# Patient Record
Sex: MALE | Race: OTHER | Employment: FULL TIME | URBAN - METROPOLITAN AREA
[De-identification: names, ages, dates, MRNs, and addresses within clinical notes are randomized per-mention and may not be internally consistent; named-entity substitution may affect disease eponyms.]

---

## 2021-02-10 ENCOUNTER — TELEPHONE (OUTPATIENT)
Dept: INTERNAL MEDICINE CLINIC | Facility: CLINIC | Age: 46
End: 2021-02-10

## 2021-02-10 ENCOUNTER — OFFICE VISIT (OUTPATIENT)
Dept: INTERNAL MEDICINE CLINIC | Facility: CLINIC | Age: 46
End: 2021-02-10
Payer: COMMERCIAL

## 2021-02-10 ENCOUNTER — LAB ENCOUNTER (OUTPATIENT)
Dept: LAB | Age: 46
End: 2021-02-10
Attending: INTERNAL MEDICINE
Payer: COMMERCIAL

## 2021-02-10 VITALS
HEART RATE: 64 BPM | HEIGHT: 69 IN | SYSTOLIC BLOOD PRESSURE: 137 MMHG | WEIGHT: 218 LBS | DIASTOLIC BLOOD PRESSURE: 85 MMHG | BODY MASS INDEX: 32.29 KG/M2

## 2021-02-10 DIAGNOSIS — R09.81 NASAL CONGESTION: ICD-10-CM

## 2021-02-10 DIAGNOSIS — M65.311 TRIGGER FINGER OF RIGHT THUMB: ICD-10-CM

## 2021-02-10 DIAGNOSIS — Z00.00 PE (PHYSICAL EXAM), ROUTINE: Primary | ICD-10-CM

## 2021-02-10 DIAGNOSIS — Z00.00 PE (PHYSICAL EXAM), ROUTINE: ICD-10-CM

## 2021-02-10 LAB
ALBUMIN SERPL-MCNC: 4.2 G/DL (ref 3.4–5)
ALBUMIN/GLOB SERPL: 1.3 {RATIO} (ref 1–2)
ALP LIVER SERPL-CCNC: 73 U/L
ALT SERPL-CCNC: 58 U/L
ANION GAP SERPL CALC-SCNC: 4 MMOL/L (ref 0–18)
AST SERPL-CCNC: 26 U/L (ref 15–37)
BASOPHILS # BLD AUTO: 0.07 X10(3) UL (ref 0–0.2)
BASOPHILS NFR BLD AUTO: 0.8 %
BILIRUB SERPL-MCNC: 0.8 MG/DL (ref 0.1–2)
BILIRUB UR QL: NEGATIVE
BUN BLD-MCNC: 15 MG/DL (ref 7–18)
BUN/CREAT SERPL: 13.6 (ref 10–20)
CALCIUM BLD-MCNC: 9.5 MG/DL (ref 8.5–10.1)
CHLORIDE SERPL-SCNC: 105 MMOL/L (ref 98–112)
CHOLEST SMN-MCNC: 228 MG/DL (ref ?–200)
CLARITY UR: CLEAR
CO2 SERPL-SCNC: 31 MMOL/L (ref 21–32)
COLOR UR: YELLOW
CREAT BLD-MCNC: 1.1 MG/DL
DEPRECATED RDW RBC AUTO: 37.9 FL (ref 35.1–46.3)
EOSINOPHIL # BLD AUTO: 0.07 X10(3) UL (ref 0–0.7)
EOSINOPHIL NFR BLD AUTO: 0.8 %
ERYTHROCYTE [DISTWIDTH] IN BLOOD BY AUTOMATED COUNT: 11.9 % (ref 11–15)
GLOBULIN PLAS-MCNC: 3.2 G/DL (ref 2.8–4.4)
GLUCOSE BLD-MCNC: 91 MG/DL (ref 70–99)
GLUCOSE UR-MCNC: NEGATIVE MG/DL
HCT VFR BLD AUTO: 47.1 %
HDLC SERPL-MCNC: 42 MG/DL (ref 40–59)
HGB BLD-MCNC: 15.8 G/DL
HGB UR QL STRIP.AUTO: NEGATIVE
IMM GRANULOCYTES # BLD AUTO: 0.02 X10(3) UL (ref 0–1)
IMM GRANULOCYTES NFR BLD: 0.2 %
KETONES UR-MCNC: NEGATIVE MG/DL
LDLC SERPL CALC-MCNC: 157 MG/DL (ref ?–100)
LEUKOCYTE ESTERASE UR QL STRIP.AUTO: NEGATIVE
LYMPHOCYTES # BLD AUTO: 2.54 X10(3) UL (ref 1–4)
LYMPHOCYTES NFR BLD AUTO: 30.5 %
M PROTEIN MFR SERPL ELPH: 7.4 G/DL (ref 6.4–8.2)
MCH RBC QN AUTO: 29.1 PG (ref 26–34)
MCHC RBC AUTO-ENTMCNC: 33.5 G/DL (ref 31–37)
MCV RBC AUTO: 86.7 FL
MONOCYTES # BLD AUTO: 0.52 X10(3) UL (ref 0.1–1)
MONOCYTES NFR BLD AUTO: 6.2 %
NEUTROPHILS # BLD AUTO: 5.11 X10 (3) UL (ref 1.5–7.7)
NEUTROPHILS # BLD AUTO: 5.11 X10(3) UL (ref 1.5–7.7)
NEUTROPHILS NFR BLD AUTO: 61.5 %
NITRITE UR QL STRIP.AUTO: NEGATIVE
NONHDLC SERPL-MCNC: 186 MG/DL (ref ?–130)
OSMOLALITY SERPL CALC.SUM OF ELEC: 290 MOSM/KG (ref 275–295)
PATIENT FASTING Y/N/NP: YES
PATIENT FASTING Y/N/NP: YES
PH UR: 6 [PH] (ref 5–8)
PLATELET # BLD AUTO: 272 10(3)UL (ref 150–450)
POTASSIUM SERPL-SCNC: 4.3 MMOL/L (ref 3.5–5.1)
PROT UR-MCNC: NEGATIVE MG/DL
RBC # BLD AUTO: 5.43 X10(6)UL
SODIUM SERPL-SCNC: 140 MMOL/L (ref 136–145)
SP GR UR STRIP: 1.01 (ref 1–1.03)
TRIGL SERPL-MCNC: 147 MG/DL (ref 30–149)
TSI SER-ACNC: 1.49 MIU/ML (ref 0.36–3.74)
UROBILINOGEN UR STRIP-ACNC: <2
VLDLC SERPL CALC-MCNC: 29 MG/DL (ref 0–30)
WBC # BLD AUTO: 8.3 X10(3) UL (ref 4–11)

## 2021-02-10 PROCEDURE — 85025 COMPLETE CBC W/AUTO DIFF WBC: CPT

## 2021-02-10 PROCEDURE — 3075F SYST BP GE 130 - 139MM HG: CPT | Performed by: INTERNAL MEDICINE

## 2021-02-10 PROCEDURE — 84443 ASSAY THYROID STIM HORMONE: CPT

## 2021-02-10 PROCEDURE — 99386 PREV VISIT NEW AGE 40-64: CPT | Performed by: INTERNAL MEDICINE

## 2021-02-10 PROCEDURE — 3079F DIAST BP 80-89 MM HG: CPT | Performed by: INTERNAL MEDICINE

## 2021-02-10 PROCEDURE — 99213 OFFICE O/P EST LOW 20 MIN: CPT | Performed by: INTERNAL MEDICINE

## 2021-02-10 PROCEDURE — 81003 URINALYSIS AUTO W/O SCOPE: CPT | Performed by: INTERNAL MEDICINE

## 2021-02-10 PROCEDURE — 36415 COLL VENOUS BLD VENIPUNCTURE: CPT

## 2021-02-10 PROCEDURE — 3008F BODY MASS INDEX DOCD: CPT | Performed by: INTERNAL MEDICINE

## 2021-02-10 PROCEDURE — 80053 COMPREHEN METABOLIC PANEL: CPT

## 2021-02-10 PROCEDURE — 80061 LIPID PANEL: CPT

## 2021-02-10 RX ORDER — FLUTICASONE PROPIONATE 50 MCG
2 SPRAY, SUSPENSION (ML) NASAL DAILY
Qty: 1 BOTTLE | Refills: 0 | Status: ON HOLD | OUTPATIENT
Start: 2021-02-10 | End: 2021-02-22

## 2021-02-10 NOTE — PROGRESS NOTES
HPI:    Patient ID: Ranjana Madrigal is a 39year old male.   Patient presents with:  Establish Care    Patient presents today for physical exam, states doing well otherwise ,has some nasal congestion headache from that occasionally and  Feels  r thumb gets Nare route daily. Apply two puffs in each nostril once daily for 1-2 weeks then as needed 1 Bottle 0     Allergies:No Known Allergies   PHYSICAL EXAM:   Physical Exam   Constitutional: He is oriented to person, place, and time.  He appears well-developed an Alesha Shah as tolerated   Complete labs as ordered,   Preventative health maintenance tests reviewed   Immunizations reviewed -refused   Patient verbalized understanding and compliance     Nasal congestion  Septal deviation   Headache   Refer to  ent   flona

## 2021-02-10 NOTE — TELEPHONE ENCOUNTER
Per patient, he is at the pharmacy and he is waiting for the cream for his hand that doctor told him that she is sending to his pharmacy.

## 2021-02-11 ENCOUNTER — OFFICE VISIT (OUTPATIENT)
Dept: OTOLARYNGOLOGY | Facility: CLINIC | Age: 46
End: 2021-02-11
Payer: COMMERCIAL

## 2021-02-11 VITALS
HEIGHT: 69 IN | BODY MASS INDEX: 32.29 KG/M2 | SYSTOLIC BLOOD PRESSURE: 129 MMHG | DIASTOLIC BLOOD PRESSURE: 80 MMHG | WEIGHT: 218 LBS | TEMPERATURE: 98 F

## 2021-02-11 DIAGNOSIS — J34.3 HYPERTROPHY OF NASAL TURBINATES: ICD-10-CM

## 2021-02-11 DIAGNOSIS — J34.2 DEVIATED NASAL SEPTUM: Primary | ICD-10-CM

## 2021-02-11 PROCEDURE — 3079F DIAST BP 80-89 MM HG: CPT | Performed by: OTOLARYNGOLOGY

## 2021-02-11 PROCEDURE — 3074F SYST BP LT 130 MM HG: CPT | Performed by: OTOLARYNGOLOGY

## 2021-02-11 PROCEDURE — 99244 OFF/OP CNSLTJ NEW/EST MOD 40: CPT | Performed by: OTOLARYNGOLOGY

## 2021-02-11 PROCEDURE — 3008F BODY MASS INDEX DOCD: CPT | Performed by: OTOLARYNGOLOGY

## 2021-02-11 NOTE — PROGRESS NOTES
Aylin Kenyon is a 39year old male. Patient presents with:  Nose Problem: c/o trouble breathing in right nostril for a while      HISTORY OF PRESENT ILLNESS  He presents with a long history of chronic nasal obstruction.   Feels that he is essentially bl change and rash. Hema/Lymph Negative Easy bleeding and easy bruising.            PHYSICAL EXAM    /80   Temp 97.9 °F (36.6 °C) (Tympanic)   Ht 5' 9\" (1.753 m)   Wt 218 lb (98.9 kg)   BMI 32.19 kg/m²        Constitutional Normal Overall appearance - 0  ASSESSMENT AND PLAN    1. Deviated nasal septum  Very deviated septum to the right with reflexive inferior turbinate hypertrophy primarily on the left.   We did begin discussions regarding repair of his deviated septum as he just cannot breathe through h

## 2021-02-18 RX ORDER — MULTIVIT-MIN/IRON FUM/FOLIC AC 7.5 MG-4
1 TABLET ORAL DAILY
COMMUNITY
End: 2022-01-26

## 2021-02-19 ENCOUNTER — LAB ENCOUNTER (OUTPATIENT)
Dept: LAB | Facility: HOSPITAL | Age: 46
End: 2021-02-19
Attending: OTOLARYNGOLOGY
Payer: COMMERCIAL

## 2021-02-19 DIAGNOSIS — Z01.818 PREOP TESTING: ICD-10-CM

## 2021-02-19 LAB — SARS-COV-2 RNA RESP QL NAA+PROBE: NOT DETECTED

## 2021-02-22 ENCOUNTER — HOSPITAL ENCOUNTER (OUTPATIENT)
Facility: HOSPITAL | Age: 46
Setting detail: HOSPITAL OUTPATIENT SURGERY
Discharge: HOME | End: 2021-02-22
Attending: OTOLARYNGOLOGY | Admitting: OTOLARYNGOLOGY
Payer: COMMERCIAL

## 2021-02-22 ENCOUNTER — ANESTHESIA EVENT (OUTPATIENT)
Dept: SURGERY | Facility: HOSPITAL | Age: 46
End: 2021-02-22
Payer: COMMERCIAL

## 2021-02-22 ENCOUNTER — ANESTHESIA (OUTPATIENT)
Dept: SURGERY | Facility: HOSPITAL | Age: 46
End: 2021-02-22
Payer: COMMERCIAL

## 2021-02-22 VITALS
TEMPERATURE: 98 F | HEART RATE: 79 BPM | WEIGHT: 216.88 LBS | OXYGEN SATURATION: 96 % | RESPIRATION RATE: 14 BRPM | DIASTOLIC BLOOD PRESSURE: 82 MMHG | HEIGHT: 69 IN | SYSTOLIC BLOOD PRESSURE: 119 MMHG | BODY MASS INDEX: 32.12 KG/M2

## 2021-02-22 DIAGNOSIS — J34.2 DEVIATED NASAL SEPTUM: ICD-10-CM

## 2021-02-22 DIAGNOSIS — Z01.818 PREOP TESTING: Primary | ICD-10-CM

## 2021-02-22 DIAGNOSIS — J34.3 HYPERTROPHY OF NASAL TURBINATES: ICD-10-CM

## 2021-02-22 PROCEDURE — 09SL7ZZ REPOSITION NASAL TURBINATE, VIA NATURAL OR ARTIFICIAL OPENING: ICD-10-PCS | Performed by: OTOLARYNGOLOGY

## 2021-02-22 PROCEDURE — 09SM0ZZ REPOSITION NASAL SEPTUM, OPEN APPROACH: ICD-10-PCS | Performed by: OTOLARYNGOLOGY

## 2021-02-22 PROCEDURE — 095L7ZZ DESTRUCTION OF NASAL TURBINATE, VIA NATURAL OR ARTIFICIAL OPENING: ICD-10-PCS | Performed by: OTOLARYNGOLOGY

## 2021-02-22 PROCEDURE — 30520 REPAIR OF NASAL SEPTUM: CPT | Performed by: OTOLARYNGOLOGY

## 2021-02-22 PROCEDURE — 30140 RESECT INFERIOR TURBINATE: CPT | Performed by: OTOLARYNGOLOGY

## 2021-02-22 RX ORDER — METOCLOPRAMIDE 10 MG/1
10 TABLET ORAL ONCE
Status: COMPLETED | OUTPATIENT
Start: 2021-02-22 | End: 2021-02-22

## 2021-02-22 RX ORDER — HALOPERIDOL 5 MG/ML
0.25 INJECTION INTRAMUSCULAR ONCE AS NEEDED
Status: DISCONTINUED | OUTPATIENT
Start: 2021-02-22 | End: 2021-02-22

## 2021-02-22 RX ORDER — HYDROCODONE BITARTRATE AND ACETAMINOPHEN 5; 325 MG/1; MG/1
1 TABLET ORAL AS NEEDED
Status: DISCONTINUED | OUTPATIENT
Start: 2021-02-22 | End: 2021-02-22

## 2021-02-22 RX ORDER — SODIUM CHLORIDE/ALOE VERA
GEL (GRAM) NASAL AS NEEDED
Status: DISCONTINUED | OUTPATIENT
Start: 2021-02-22 | End: 2021-02-22 | Stop reason: HOSPADM

## 2021-02-22 RX ORDER — ACETAMINOPHEN 500 MG
1000 TABLET ORAL ONCE
Status: COMPLETED | OUTPATIENT
Start: 2021-02-22 | End: 2021-02-22

## 2021-02-22 RX ORDER — SODIUM CHLORIDE 0.9 % (FLUSH) 0.9 %
10 SYRINGE (ML) INJECTION AS NEEDED
Status: CANCELLED | OUTPATIENT
Start: 2021-02-22

## 2021-02-22 RX ORDER — MORPHINE SULFATE 10 MG/ML
6 INJECTION, SOLUTION INTRAMUSCULAR; INTRAVENOUS EVERY 10 MIN PRN
Status: DISCONTINUED | OUTPATIENT
Start: 2021-02-22 | End: 2021-02-22

## 2021-02-22 RX ORDER — HYDROCODONE BITARTRATE AND ACETAMINOPHEN 7.5; 325 MG/1; MG/1
1 TABLET ORAL EVERY 6 HOURS PRN
Qty: 30 TABLET | Refills: 0 | Status: SHIPPED | OUTPATIENT
Start: 2021-02-22 | End: 2021-09-20

## 2021-02-22 RX ORDER — LIDOCAINE HYDROCHLORIDE 10 MG/ML
INJECTION, SOLUTION EPIDURAL; INFILTRATION; INTRACAUDAL; PERINEURAL AS NEEDED
Status: DISCONTINUED | OUTPATIENT
Start: 2021-02-22 | End: 2021-02-22 | Stop reason: SURG

## 2021-02-22 RX ORDER — ACETAMINOPHEN 160 MG/5ML
650 SOLUTION ORAL EVERY 4 HOURS PRN
Status: CANCELLED | OUTPATIENT
Start: 2021-02-22

## 2021-02-22 RX ORDER — HYDROCODONE BITARTRATE AND ACETAMINOPHEN 5; 325 MG/1; MG/1
1 TABLET ORAL EVERY 4 HOURS PRN
Status: CANCELLED | OUTPATIENT
Start: 2021-02-22

## 2021-02-22 RX ORDER — MORPHINE SULFATE 4 MG/ML
4 INJECTION, SOLUTION INTRAMUSCULAR; INTRAVENOUS EVERY 10 MIN PRN
Status: DISCONTINUED | OUTPATIENT
Start: 2021-02-22 | End: 2021-02-22

## 2021-02-22 RX ORDER — ONDANSETRON 2 MG/ML
4 INJECTION INTRAMUSCULAR; INTRAVENOUS ONCE AS NEEDED
Status: DISCONTINUED | OUTPATIENT
Start: 2021-02-22 | End: 2021-02-22

## 2021-02-22 RX ORDER — FAMOTIDINE 20 MG/1
20 TABLET ORAL ONCE
Status: COMPLETED | OUTPATIENT
Start: 2021-02-22 | End: 2021-02-22

## 2021-02-22 RX ORDER — ACETAMINOPHEN 325 MG/1
650 TABLET ORAL EVERY 4 HOURS PRN
Status: CANCELLED | OUTPATIENT
Start: 2021-02-22

## 2021-02-22 RX ORDER — DIAPER,BRIEF,INFANT-TODD,DISP
EACH MISCELLANEOUS AS NEEDED
Status: DISCONTINUED | OUTPATIENT
Start: 2021-02-22 | End: 2021-02-22 | Stop reason: HOSPADM

## 2021-02-22 RX ORDER — CEFAZOLIN SODIUM/WATER 2 G/20 ML
SYRINGE (ML) INTRAVENOUS AS NEEDED
Status: DISCONTINUED | OUTPATIENT
Start: 2021-02-22 | End: 2021-02-22 | Stop reason: SURG

## 2021-02-22 RX ORDER — SODIUM CHLORIDE, SODIUM LACTATE, POTASSIUM CHLORIDE, CALCIUM CHLORIDE 600; 310; 30; 20 MG/100ML; MG/100ML; MG/100ML; MG/100ML
INJECTION, SOLUTION INTRAVENOUS CONTINUOUS
Status: DISCONTINUED | OUTPATIENT
Start: 2021-02-22 | End: 2021-02-22

## 2021-02-22 RX ORDER — HYDROMORPHONE HYDROCHLORIDE 1 MG/ML
0.4 INJECTION, SOLUTION INTRAMUSCULAR; INTRAVENOUS; SUBCUTANEOUS EVERY 5 MIN PRN
Status: DISCONTINUED | OUTPATIENT
Start: 2021-02-22 | End: 2021-02-22

## 2021-02-22 RX ORDER — PROCHLORPERAZINE EDISYLATE 5 MG/ML
5 INJECTION INTRAMUSCULAR; INTRAVENOUS ONCE AS NEEDED
Status: DISCONTINUED | OUTPATIENT
Start: 2021-02-22 | End: 2021-02-22

## 2021-02-22 RX ORDER — LIDOCAINE HYDROCHLORIDE AND EPINEPHRINE 10; 10 MG/ML; UG/ML
INJECTION, SOLUTION INFILTRATION; PERINEURAL AS NEEDED
Status: DISCONTINUED | OUTPATIENT
Start: 2021-02-22 | End: 2021-02-22 | Stop reason: HOSPADM

## 2021-02-22 RX ORDER — HYDROMORPHONE HYDROCHLORIDE 1 MG/ML
0.2 INJECTION, SOLUTION INTRAMUSCULAR; INTRAVENOUS; SUBCUTANEOUS EVERY 5 MIN PRN
Status: DISCONTINUED | OUTPATIENT
Start: 2021-02-22 | End: 2021-02-22

## 2021-02-22 RX ORDER — CEPHALEXIN 500 MG/1
500 CAPSULE ORAL EVERY 8 HOURS
Qty: 21 CAPSULE | Refills: 0 | Status: SHIPPED | OUTPATIENT
Start: 2021-02-22 | End: 2021-09-20

## 2021-02-22 RX ORDER — ONDANSETRON 2 MG/ML
INJECTION INTRAMUSCULAR; INTRAVENOUS AS NEEDED
Status: DISCONTINUED | OUTPATIENT
Start: 2021-02-22 | End: 2021-02-22 | Stop reason: SURG

## 2021-02-22 RX ORDER — DEXAMETHASONE SODIUM PHOSPHATE 4 MG/ML
VIAL (ML) INJECTION AS NEEDED
Status: DISCONTINUED | OUTPATIENT
Start: 2021-02-22 | End: 2021-02-22 | Stop reason: SURG

## 2021-02-22 RX ORDER — ONDANSETRON 2 MG/ML
4 INJECTION INTRAMUSCULAR; INTRAVENOUS EVERY 6 HOURS PRN
Status: CANCELLED | OUTPATIENT
Start: 2021-02-22

## 2021-02-22 RX ORDER — ONDANSETRON 4 MG/1
4 TABLET, ORALLY DISINTEGRATING ORAL EVERY 6 HOURS PRN
Status: CANCELLED | OUTPATIENT
Start: 2021-02-22

## 2021-02-22 RX ORDER — NALOXONE HYDROCHLORIDE 0.4 MG/ML
80 INJECTION, SOLUTION INTRAMUSCULAR; INTRAVENOUS; SUBCUTANEOUS AS NEEDED
Status: DISCONTINUED | OUTPATIENT
Start: 2021-02-22 | End: 2021-02-22

## 2021-02-22 RX ORDER — MORPHINE SULFATE 4 MG/ML
2 INJECTION, SOLUTION INTRAMUSCULAR; INTRAVENOUS EVERY 10 MIN PRN
Status: DISCONTINUED | OUTPATIENT
Start: 2021-02-22 | End: 2021-02-22

## 2021-02-22 RX ORDER — HYDROMORPHONE HYDROCHLORIDE 1 MG/ML
0.6 INJECTION, SOLUTION INTRAMUSCULAR; INTRAVENOUS; SUBCUTANEOUS EVERY 5 MIN PRN
Status: DISCONTINUED | OUTPATIENT
Start: 2021-02-22 | End: 2021-02-22

## 2021-02-22 RX ORDER — HYDROCODONE BITARTRATE AND ACETAMINOPHEN 5; 325 MG/1; MG/1
2 TABLET ORAL AS NEEDED
Status: DISCONTINUED | OUTPATIENT
Start: 2021-02-22 | End: 2021-02-22

## 2021-02-22 RX ADMIN — SODIUM CHLORIDE, SODIUM LACTATE, POTASSIUM CHLORIDE, CALCIUM CHLORIDE: 600; 310; 30; 20 INJECTION, SOLUTION INTRAVENOUS at 09:51:00

## 2021-02-22 RX ADMIN — ONDANSETRON 4 MG: 2 INJECTION INTRAMUSCULAR; INTRAVENOUS at 09:25:00

## 2021-02-22 RX ADMIN — CEFAZOLIN SODIUM/WATER 2 G: 2 G/20 ML SYRINGE (ML) INTRAVENOUS at 09:32:00

## 2021-02-22 RX ADMIN — SODIUM CHLORIDE, SODIUM LACTATE, POTASSIUM CHLORIDE, CALCIUM CHLORIDE: 600; 310; 30; 20 INJECTION, SOLUTION INTRAVENOUS at 09:21:00

## 2021-02-22 RX ADMIN — DEXAMETHASONE SODIUM PHOSPHATE 4 MG: 4 MG/ML VIAL (ML) INJECTION at 09:25:00

## 2021-02-22 RX ADMIN — LIDOCAINE HYDROCHLORIDE 50 MG: 10 INJECTION, SOLUTION EPIDURAL; INFILTRATION; INTRACAUDAL; PERINEURAL at 09:25:00

## 2021-02-22 NOTE — ANESTHESIA PROCEDURE NOTES
Airway  Date/Time: 2/22/2021 9:33 AM  Urgency: Elective    Airway not difficult    General Information and Staff    Patient location during procedure: OR  Anesthesiologist: Guerline Mendoza MD  Resident/CRNA: Michael Rosario CRNA  Performed: CRNA     In

## 2021-02-22 NOTE — ANESTHESIA POSTPROCEDURE EVALUATION
Patient: Gurmeet Pica    Procedure Summary     Date: 02/22/21 Room / Location: [address] [place] [address] MAIN OR 03 / [address] [place] [address] MAIN OR    Anesthesia Start: 9113 Anesthesia Stop: 1000    Procedure: NASAL SEPTOPLASTY TURBINECTOMY (Bilateral Nose) Diagnosis:       Deviated nasal sep

## 2021-02-22 NOTE — ANESTHESIA PREPROCEDURE EVALUATION
Anesthesia PreOp Note    HPI:     Gina James is a 39year old male who presents for preoperative consultation requested by: Renetta Swanson MD    Date of Surgery: 2/22/2021    Procedure(s):  NASAL SEPTOPLASTY TURBINECTOMY  Indication: Deviated nasal s Occupational History      Not on file    Social Needs      Financial resource strain: Not on file      Food insecurity        Worry: Not on file        Inability: Not on file      Transportation needs        Medical: Not on file        Non-medical: Not on His oral temperature is 97.5 °F (36.4 °C). His blood pressure is 137/79 and his pulse is 77. His respiration is 20 and oxygen saturation is 97%.     02/18/21  1528 02/22/21  0828   BP:  137/79   Pulse:  77   Resp:  20   Temp:  97.5 °F (36.4 °C)   TempSrc:

## 2021-02-22 NOTE — INTERVAL H&P NOTE
Pre-op Diagnosis: Deviated nasal septum [J34.2]  Hypertrophy of nasal turbinates [J34.3]    The above referenced H&P was reviewed by Ashley Vale MD on 2/22/2021, the patient was examined and no significant changes have occurred in the patient's conditi

## 2021-02-22 NOTE — OPERATIVE REPORT
[facility]    PATIENT'S NAME: Alvaro Moran   ATTENDING PHYSICIAN: Isreal Putnam. Viji Yang MD   OPERATING PHYSICIAN: Isreal Putnam.  Viji Yang MD   PATIENT ACCOUNT#:   [id number]    LOCATION:  [address]  MEDICAL RECORD #:   [id number]       DATE OF B portions of bone and cartilage including a small spur along the crest on the right. This was removed using a combination of Kamari forceps, 4 mm osteotome, and a [place] elevator.   The mucoperichondrial flaps were then placed in the midline position and

## 2021-02-22 NOTE — H&P
HISTORY OF PRESENT ILLNESS  He presents with a long history of chronic nasal obstruction. Feels that he is essentially blocked completely on the right side. Sleeps primarily on the left side. No apnea but he does snore.   Feels that his sleep is not as g 97.9 °F (36.6 °C) (Tympanic)   Ht 5' 9\" (1.753 m)   Wt 218 lb (98.9 kg)   BMI 32.19 kg/m²           Constitutional Normal Overall appearance - Normal.   Psychiatric Normal Orientation - Oriented to time, place, person & situation.  Appropriate mood and aff inferior turbinate hypertrophy primarily on the left.   We did begin discussions regarding repair of his deviated septum as he just cannot breathe through his nose and has had no improvement with the use of any medications or sprays in the past.  We discuss

## 2021-02-22 NOTE — BRIEF OP NOTE
Pre-Operative Diagnosis: Deviated nasal septum [J34.2]  Hypertrophy of nasal turbinates [J34.3]     Post-Operative Diagnosis: Deviated nasal septum [R60. 2]Hypertrophy of nasal turbinates [J34.3]      Procedure Performed:   Procedure(s):  septoplasty, submu

## 2021-02-23 ENCOUNTER — TELEPHONE (OUTPATIENT)
Dept: OTOLARYNGOLOGY | Facility: CLINIC | Age: 46
End: 2021-02-23

## 2021-02-23 NOTE — TELEPHONE ENCOUNTER
Pt is  septoplasty, SMR. Per  Pt pt is doing well no bleeding, advised pt no bending or heavy lifting for the next week and pt is not to blow nose until seen by JDO.   Advised pt she can start using OTC saline nasal spray daily, afrin up to 5 days post op a

## 2021-03-02 ENCOUNTER — OFFICE VISIT (OUTPATIENT)
Dept: AUDIOLOGY | Facility: CLINIC | Age: 46
End: 2021-03-02
Payer: COMMERCIAL

## 2021-03-02 ENCOUNTER — OFFICE VISIT (OUTPATIENT)
Dept: OTOLARYNGOLOGY | Facility: CLINIC | Age: 46
End: 2021-03-02
Payer: COMMERCIAL

## 2021-03-02 VITALS
BODY MASS INDEX: 31.34 KG/M2 | SYSTOLIC BLOOD PRESSURE: 144 MMHG | HEIGHT: 69 IN | WEIGHT: 211.63 LBS | TEMPERATURE: 98 F | DIASTOLIC BLOOD PRESSURE: 83 MMHG

## 2021-03-02 DIAGNOSIS — H90.3 SENSORINEURAL HEARING LOSS, BILATERAL: Primary | ICD-10-CM

## 2021-03-02 DIAGNOSIS — J34.2 DEVIATED NASAL SEPTUM: ICD-10-CM

## 2021-03-02 DIAGNOSIS — H91.93 BILATERAL HEARING LOSS, UNSPECIFIED HEARING LOSS TYPE: Primary | ICD-10-CM

## 2021-03-02 PROCEDURE — 92567 TYMPANOMETRY: CPT | Performed by: AUDIOLOGIST

## 2021-03-02 PROCEDURE — 3079F DIAST BP 80-89 MM HG: CPT | Performed by: OTOLARYNGOLOGY

## 2021-03-02 PROCEDURE — 92557 COMPREHENSIVE HEARING TEST: CPT | Performed by: AUDIOLOGIST

## 2021-03-02 PROCEDURE — 3008F BODY MASS INDEX DOCD: CPT | Performed by: OTOLARYNGOLOGY

## 2021-03-02 PROCEDURE — 3077F SYST BP >= 140 MM HG: CPT | Performed by: OTOLARYNGOLOGY

## 2021-03-02 PROCEDURE — 99213 OFFICE O/P EST LOW 20 MIN: CPT | Performed by: OTOLARYNGOLOGY

## 2021-03-02 NOTE — PROGRESS NOTES
AUDIOLOGY REPORT      Wilver Mcdonald is a 39year old male     Referring Provider: Ting Nieves   YOB: 1975  Medical Record: [id number]      Patient Hearing History:  Patient reported a question about his hearing.    He noted that in the pa Valery  Audiologist

## 2021-03-03 NOTE — PROGRESS NOTES
Robert Ott is a 39year old male.   Patient presents with:  Post-Op: septoplasty and smr done on 2/22/2021, pt is doing well       HISTORY OF PRESENT ILLNESS    He presents with a long history of chronic nasal obstruction.  Feels that he is essentially Surgical History:   Procedure Laterality Date   • EXCISION TURBINATE,SUBMUCOUS  02/22/2021   • NASAL SEPTOPLASTY TURBINECTOMY Bilateral 2/22/2021    Performed by Cheko Maddox MD at [address] [place] [address] OR   • OTHER      removal of mole in the head   • REPAIR OF NICOLASA Inspection - Normal.        Lymph Detail Normal Submental. Submandibular. Anterior cervical. Posterior cervical. Supraclavicular.         Nose/Mouth/Throat Normal External nose - Normal. Lips/teeth/gums - Normal. Tonsils - Normal. Oropharynx - Normal.   Nos may still exist    Dayron Ocampo MD    3/2/2021    11:06 PM

## 2021-03-06 ENCOUNTER — ORDER TRANSCRIPTION (OUTPATIENT)
Dept: ADMINISTRATIVE | Facility: HOSPITAL | Age: 46
End: 2021-03-06

## 2021-03-08 ENCOUNTER — ORDER TRANSCRIPTION (OUTPATIENT)
Dept: ADMINISTRATIVE | Facility: HOSPITAL | Age: 46
End: 2021-03-08

## 2021-03-08 DIAGNOSIS — R29.4 CLICKING OF RIGHT HIP: Primary | ICD-10-CM

## 2021-04-01 ENCOUNTER — HOSPITAL ENCOUNTER (OUTPATIENT)
Dept: ULTRASOUND IMAGING | Facility: HOSPITAL | Age: 46
Discharge: HOME | End: 2021-04-01
Attending: PAIN MEDICINE
Payer: COMMERCIAL

## 2021-04-01 DIAGNOSIS — R29.4 CLICKING OF RIGHT HIP: ICD-10-CM

## 2021-04-01 DIAGNOSIS — M79.644 PAIN OF RIGHT THUMB: ICD-10-CM

## 2021-04-01 PROCEDURE — 76882 US LMTD JT/FCL EVL NVASC XTR: CPT | Performed by: PAIN MEDICINE

## 2021-04-01 PROCEDURE — 76881 US COMPL JOINT R-T W/IMG: CPT | Performed by: PAIN MEDICINE

## 2021-04-26 ENCOUNTER — ORDER TRANSCRIPTION (OUTPATIENT)
Dept: ADMINISTRATIVE | Facility: HOSPITAL | Age: 46
End: 2021-04-26

## 2021-04-26 DIAGNOSIS — R20.0 BILATERAL HAND NUMBNESS: Primary | ICD-10-CM

## 2021-05-13 ENCOUNTER — PROCEDURE VISIT (OUTPATIENT)
Dept: NEUROLOGY | Facility: CLINIC | Age: 46
End: 2021-05-13
Payer: COMMERCIAL

## 2021-05-13 DIAGNOSIS — G56.03 BILATERAL CARPAL TUNNEL SYNDROME: Primary | ICD-10-CM

## 2021-05-13 PROCEDURE — 95910 NRV CNDJ TEST 7-8 STUDIES: CPT | Performed by: PHYSICAL MEDICINE & REHABILITATION

## 2021-05-13 PROCEDURE — 95886 MUSC TEST DONE W/N TEST COMP: CPT | Performed by: PHYSICAL MEDICINE & REHABILITATION

## 2021-05-16 PROBLEM — G56.03 BILATERAL CARPAL TUNNEL SYNDROME: Status: ACTIVE | Noted: 2021-05-16

## 2021-05-16 NOTE — PROGRESS NOTES
[address]  Electromyography Consultation      History of Present Illness:    Dear Dr. Keith Epley,  Thank you for the opportunity to see Emy Hoo for electrodiagnostic consultation today.  As you know the daily. (Patient not taking: Reported on 2/11/2021 ) 1 g 0       ALLERGIES:   No Known Allergies       PHYSICAL EXAM:   There were no vitals taken for this visit. There is no height or weight on file to calculate BMI.       General: No immediate distress Wrist 8 Wrist - ADM 3.35 12.5  100 5.30 33.1      Ref. Ref. 4.20 5.0          B. Elbow 23.5 B. Elbow - Wrist 6.95 13.1 65.3 105 5.60 35.2      Ref. Ref. 50.0      L ULNAR - ADM      Wrist 8 Wrist - ADM 2.85 11.2  100 5.75 30.8      Ref.   Ref. 4.20 5.0 electrodiagnostic evidence of cervical radiculopathy bilaterally         ASSESSMENT AND PLAN:  1.  Bilateral carpal tunnel syndrome  The patient has clinical and electrodiagnostic features indicating minimal bilateral median neuropathy with demyelination of

## 2021-07-23 ENCOUNTER — MED REC SCAN ONLY (OUTPATIENT)
Dept: INTERNAL MEDICINE CLINIC | Facility: CLINIC | Age: 46
End: 2021-07-23

## 2021-09-15 ENCOUNTER — NURSE TRIAGE (OUTPATIENT)
Dept: INTERNAL MEDICINE CLINIC | Facility: CLINIC | Age: 46
End: 2021-09-15

## 2021-09-15 NOTE — TELEPHONE ENCOUNTER
Action Requested: Summary for Provider     []  Critical Lab, Recommendations Needed  [] Need Additional Advice  []   FYI    []   Need Orders  [] Need Medications Sent to Pharmacy  []  Other     SUMMARY:   Reports chest pain, \"private pain\", shaking, irri

## 2021-09-16 NOTE — TELEPHONE ENCOUNTER
Disposition and Plan      Clinical Impression:  Anxiety  (primary encounter diagnosis)     Disposition:  Discharge     Follow-up:  Glen Zee MD  [address] [place], [place] [id number] [phone]     Schedule an appointment as soon as possible for

## 2021-09-16 NOTE — ED PROVIDER NOTES
Patient Seen in: [facility] AND [facility] Emergency Department    History   Patient presents with: Anxiety/Panic attack      HPI    Patient presents to the ED complaining of severe anxiety and feeling like he is \"shaking\" inside.   He states that he has had a on my arrival to the room.  Personal protective equipment including droplet mask, eye protection, and gloves were worn throughout the duration of the exam.  Handwashing was performed prior to and after the exam.  Stethoscope and any equipment used during my sinus rhythm    Differential Diagnosis/ Diagnostic Considerations: Anxiety, stress    Medical Record Review: I personally reviewed available prior medical records for any recent pertinent discharge summaries, testing, and procedures and reviewed those repo

## 2021-09-17 ENCOUNTER — PATIENT OUTREACH (OUTPATIENT)
Dept: CASE MANAGEMENT | Age: 46
End: 2021-09-17

## 2021-09-17 NOTE — PROGRESS NOTES
Patient LVM requesting assistance scheduling apt     [address] [facility]  [place] [address] [phone]  Apt made for Mon. Sept. 20th @1:40pm    Patient notified

## 2021-09-20 ENCOUNTER — TELEPHONE (OUTPATIENT)
Dept: INTERNAL MEDICINE CLINIC | Facility: CLINIC | Age: 46
End: 2021-09-20

## 2021-09-20 PROBLEM — F41.9 ANXIETY: Status: ACTIVE | Noted: 2021-09-20

## 2021-09-20 NOTE — TELEPHONE ENCOUNTER
This is our allergist in our  Group     I do not think that patient has need to be seen sooner - with mosquito bites allergies-symptoms are not so much.     Can keep appointment-if patient wants to be seen sooner might need to look for allergist-need  recom

## 2021-09-20 NOTE — TELEPHONE ENCOUNTER
Patient doesn't want to wait till oct 12XL for [facility] would like a different provider

## 2021-09-20 NOTE — TELEPHONE ENCOUNTER
Advised patient of Dr Hannah Siddiqui note. Patient verbalized understanding and had no further questions.

## 2021-09-20 NOTE — PROGRESS NOTES
Subjective:   Patient ID: Mercy Krause is a 55year old male. Patient presents with:  ER F/U: Stts he was at [address] [place] [address] ER on 9/15 due to anxiety    Patient in office today for anxiety  F/u  ER pt state feels well now had some stress with girlfriend .   Kenneth Vascular: No JVD. Cardiovascular:      Rate and Rhythm: Normal rate and regular rhythm. Heart sounds: Normal heart sounds. No murmur heard. Pulmonary:      Effort: Pulmonary effort is normal. No respiratory distress.       Breath sounds: Normal Platelet      Comp Metabolic Panel (14)      TSH W Reflex To Free T4      Meds This Visit:  Requested Prescriptions      No prescriptions requested or ordered in this encounter       Imaging & Referrals:  None

## 2021-10-20 ENCOUNTER — HOSPITAL ENCOUNTER (OUTPATIENT)
Dept: CV DIAGNOSTICS | Facility: HOSPITAL | Age: 46
Discharge: HOME | End: 2021-10-20
Attending: INTERNAL MEDICINE
Payer: COMMERCIAL

## 2021-10-20 DIAGNOSIS — F41.9 ANXIETY: ICD-10-CM

## 2021-10-20 DIAGNOSIS — F41.0 PANIC ATTACKS: ICD-10-CM

## 2021-10-20 DIAGNOSIS — R06.02 SOB (SHORTNESS OF BREATH): ICD-10-CM

## 2021-10-20 PROCEDURE — 93306 TTE W/DOPPLER COMPLETE: CPT | Performed by: INTERNAL MEDICINE

## 2021-10-25 ENCOUNTER — TELEPHONE (OUTPATIENT)
Dept: INTERNAL MEDICINE CLINIC | Facility: CLINIC | Age: 46
End: 2021-10-25

## 2021-10-25 NOTE — TELEPHONE ENCOUNTER
Spoke with pt,  verified, pt is req for depression medication. Pt is seeing psychiatrist for mild depression. Pt also seen by Dr Devi Kee on 21 and was advised to f/u in 4 weeks. Pt is now looking for f/u appt.   Can we use Res 24 on

## 2021-10-27 PROBLEM — F41.9 ANXIETY AND DEPRESSION: Status: ACTIVE | Noted: 2021-10-27

## 2021-10-27 PROBLEM — F32.A ANXIETY AND DEPRESSION: Status: ACTIVE | Noted: 2021-10-27

## 2021-10-27 PROBLEM — E78.00 PURE HYPERCHOLESTEROLEMIA: Status: ACTIVE | Noted: 2021-10-27

## 2021-10-27 NOTE — PROGRESS NOTES
Subjective:   Patient ID: Elena Ortega is a 55year old male. Patient presents with: Follow - Up: Completed ECHO on 10/20/21.   Requesting for a stress test    Patient in office today for anxiety and depression patient states he seen the therapist and not taking: Reported on 10/27/2021)       Allergies:No Known Allergies    Objective:   Physical Exam  Vitals and nursing note reviewed. Constitutional:       General: He is not in acute distress. Appearance: He is well-developed.       Interventions: patient patient has anxiety and depression and he experienced the symptoms of prolonged period of time patient states from his therapist he should start with some medication at this time patient agrees to start with medicine  A small dose patient to start

## 2021-11-08 ENCOUNTER — LAB ENCOUNTER (OUTPATIENT)
Dept: LAB | Age: 46
End: 2021-11-08
Attending: ALLERGY & IMMUNOLOGY
Payer: COMMERCIAL

## 2021-11-08 ENCOUNTER — OFFICE VISIT (OUTPATIENT)
Dept: ALLERGY | Facility: CLINIC | Age: 46
End: 2021-11-08
Payer: COMMERCIAL

## 2021-11-08 ENCOUNTER — NURSE ONLY (OUTPATIENT)
Dept: ALLERGY | Facility: CLINIC | Age: 46
End: 2021-11-08
Payer: COMMERCIAL

## 2021-11-08 VITALS
RESPIRATION RATE: 18 BRPM | OXYGEN SATURATION: 96 % | DIASTOLIC BLOOD PRESSURE: 77 MMHG | HEART RATE: 79 BPM | SYSTOLIC BLOOD PRESSURE: 142 MMHG

## 2021-11-08 DIAGNOSIS — Z91.038 HYMENOPTERA ALLERGY: Primary | ICD-10-CM

## 2021-11-08 DIAGNOSIS — Z91.018 FOOD ALLERGY: ICD-10-CM

## 2021-11-08 DIAGNOSIS — Z91.038 HYMENOPTERA ALLERGY: ICD-10-CM

## 2021-11-08 DIAGNOSIS — T63.481A INSECT STINGS, ACCIDENTAL OR UNINTENTIONAL, INITIAL ENCOUNTER: ICD-10-CM

## 2021-11-08 DIAGNOSIS — R23.2 FLUSHING: ICD-10-CM

## 2021-11-08 PROCEDURE — 86003 ALLG SPEC IGE CRUDE XTRC EA: CPT

## 2021-11-08 PROCEDURE — 99244 OFF/OP CNSLTJ NEW/EST MOD 40: CPT | Performed by: ALLERGY & IMMUNOLOGY

## 2021-11-08 PROCEDURE — 3077F SYST BP >= 140 MM HG: CPT | Performed by: ALLERGY & IMMUNOLOGY

## 2021-11-08 PROCEDURE — 36415 COLL VENOUS BLD VENIPUNCTURE: CPT

## 2021-11-08 PROCEDURE — 95004 PERQ TESTS W/ALRGNC XTRCS: CPT | Performed by: ALLERGY & IMMUNOLOGY

## 2021-11-08 PROCEDURE — 3078F DIAST BP <80 MM HG: CPT | Performed by: ALLERGY & IMMUNOLOGY

## 2021-11-08 NOTE — PATIENT INSTRUCTIONS
1.  Hymenoptera sting  By reaction to wasp in the past with a large local and symptoms of feeling anxious. Check serum IgE to bees wasp yellow jackets and hornets  We will call with results  Check serum IgE to mosquito if available     2.   Food allergy  P

## 2021-11-08 NOTE — PROGRESS NOTES
Heidy Brennan is a 55year old male. HPI:   Patient presents with: Allergies: Patient reports that he was stung by wasp on hand and his hand swelled up. Also report that his skin is very sensative when he gets bitten by a mosquito as well.      Kenneth Tab Take 1 tablet (25 mg total) by mouth daily. 30 tablet 0   • Multiple Vitamins-Minerals (MULTI-VITAMIN/MINERALS) Oral Tab Take 1 tablet by mouth daily. • diazePAM 5 MG Oral Tab Take 1-2 tablets (5-10 mg total) by mouth nightly as needed for Sleep.  ( or clubbing  Neurological:Oriented to time, place, person & situation       ASSESSMENT/PLAN:   Assessment   Hymenoptera allergy  (primary encounter diagnosis)  Insect stings, accidental or unintentional, initial encounter  Food allergy  [place]       Skin

## 2021-11-13 ENCOUNTER — TELEPHONE (OUTPATIENT)
Dept: ALLERGY | Facility: CLINIC | Age: 46
End: 2021-11-13

## 2021-11-13 RX ORDER — EPINEPHRINE 0.3 MG/.3ML
1 INJECTION SUBCUTANEOUS ONCE
Status: CANCELLED | OUTPATIENT
Start: 2021-11-13 | End: 2021-11-13

## 2021-11-13 RX ORDER — EPINEPHRINE 0.3 MG/.3ML
INJECTION SUBCUTANEOUS
Qty: 1 EACH | Refills: 0 | Status: SHIPPED | OUTPATIENT
Start: 2021-11-13

## 2021-11-13 NOTE — TELEPHONE ENCOUNTER
Spoke with patient and informed him per Dr. Edwin Carey a prescription for EpiPen has been sent to the pharmacy. Patient verbalizes understanding, no further questions at this time.

## 2021-11-13 NOTE — TELEPHONE ENCOUNTER
Spoke with patient. Verified name and . Informed patient of test results and recommendations per Dr. Reilly Amanda. Patient states he will think about allergy shots and is requesting a prescription for an EpiPen.  Informed patient will forward this request to

## 2021-11-22 RX ORDER — SERTRALINE HYDROCHLORIDE 25 MG/1
25 TABLET, FILM COATED ORAL DAILY
Qty: 30 TABLET | Refills: 5 | Status: SHIPPED | OUTPATIENT
Start: 2021-11-22 | End: 2021-11-22

## 2021-11-22 RX ORDER — SERTRALINE HYDROCHLORIDE 25 MG/1
25 TABLET, FILM COATED ORAL DAILY
Qty: 30 TABLET | Refills: 5 | Status: SHIPPED | OUTPATIENT
Start: 2021-11-22 | End: 2022-11-21

## 2021-11-22 NOTE — TELEPHONE ENCOUNTER
Refill passed per [facility], [facility] protocol. Requested Prescriptions   Pending Prescriptions Disp Refills    SERTRALINE 25 MG Oral Tab [Pharmacy Med Name: Sertraline Hcl 25 Mg Tab Nort] 30 tablet 0     Sig: TAKE ONE TABLET BY MOUTH ONE TIME DAILY        Psychiatric Non-Scheduled (Anti-Anxiety) Passed - 11/22/2021  5:10 PM        Passed - Appointment in last 6 or next 3 months            Future Appointments         Provider Department Appt Notes    In 1 week [facility] RN RADIOLOGY 1 CALCIUM SCORE; [facility] OF [facility] [place] [address] [facility] PT aware of $75 due at the time of Reg.   BB          Recent Outpatient Visits              2 weeks ago Food allergy    [facility], [facility], [address]    Nurse Only    2 weeks ago [employer] allergy    [facility], [facility], [address] Juvenal Decree, MD    Office Visit    3 weeks ago Anxiety and depression    Eleanor Cohn MD    Office Visit    2 months ago [address], [address] Daralyn Crete, MD    Office Visit    8 months ago Sensorineural hearing loss, bilateral    [facility] Audiology [place]    Office Visit

## 2021-11-22 NOTE — TELEPHONE ENCOUNTER
Refill passed per [place] [employer] [facility], [facility] protocol. Requested Prescriptions   Pending Prescriptions Disp Refills    sertraline 25 MG Oral Tab 30 tablet 5     Sig: Take 1 tablet (25 mg total) by mouth daily. Psychiatric Non-Scheduled (Anti-Anxiety) Passed - 11/22/2021  5:12 PM        Passed - Appointment in last 6 or next 3 months            Future Appointments         Provider Department Appt Notes    In 1 week [facility] RN RADIOLOGY 1 CALCIUM SCORE; [facility] OF THE [facility] [address] [facility] PT aware of $75 due at the time of Reg.   BB          Recent Outpatient Visits              2 weeks ago Food allergy    [facility], [address]    Nurse Only    2 weeks ago [employer] allergy    [facility], [facility], [address] Jason Bunk, MD    Office Visit    3 weeks ago Anxiety and depression    Pati Cifuentes MD    Office Visit    2 months ago [address], [address] [place], [place], Seltjarnarnes, MD    Office Visit    8 months ago Sensorineural hearing loss, bilateral    [facility] Aud[facility] [place]    Office Visit

## 2021-11-23 RX ORDER — SERTRALINE HYDROCHLORIDE 25 MG/1
25 TABLET, FILM COATED ORAL DAILY
Qty: 30 TABLET | Refills: 5 | OUTPATIENT
Start: 2021-11-23

## 2021-11-30 ENCOUNTER — HOSPITAL ENCOUNTER (OUTPATIENT)
Dept: CT IMAGING | Facility: HOSPITAL | Age: 46
Discharge: HOME | End: 2021-11-30
Attending: INTERNAL MEDICINE

## 2021-11-30 DIAGNOSIS — Z13.6 ENCOUNTER FOR SCREENING FOR CARDIOVASCULAR DISORDERS: ICD-10-CM

## 2023-02-13 ENCOUNTER — OFFICE VISIT (OUTPATIENT)
Dept: INTERNAL MEDICINE CLINIC | Facility: CLINIC | Age: 48
End: 2023-02-13

## 2023-02-13 VITALS
HEART RATE: 60 BPM | SYSTOLIC BLOOD PRESSURE: 112 MMHG | WEIGHT: 205 LBS | HEIGHT: 69 IN | DIASTOLIC BLOOD PRESSURE: 74 MMHG | BODY MASS INDEX: 30.36 KG/M2

## 2023-02-13 DIAGNOSIS — M79.641 RIGHT HAND PAIN: ICD-10-CM

## 2023-02-13 DIAGNOSIS — M25.561 RIGHT KNEE PAIN, UNSPECIFIED CHRONICITY: Primary | ICD-10-CM

## 2023-02-21 ENCOUNTER — OFFICE VISIT (OUTPATIENT)
Dept: PHYSICAL MEDICINE AND REHAB | Facility: CLINIC | Age: 48
End: 2023-02-21
Payer: COMMERCIAL

## 2023-02-21 ENCOUNTER — HOSPITAL ENCOUNTER (OUTPATIENT)
Dept: GENERAL RADIOLOGY | Facility: HOSPITAL | Age: 48
Discharge: HOME | End: 2023-02-21
Attending: PHYSICAL MEDICINE & REHABILITATION
Payer: COMMERCIAL

## 2023-02-21 VITALS — WEIGHT: 205 LBS | BODY MASS INDEX: 30.36 KG/M2 | HEART RATE: 65 BPM | HEIGHT: 69 IN | OXYGEN SATURATION: 99 %

## 2023-02-21 DIAGNOSIS — E78.00 PURE HYPERCHOLESTEROLEMIA: ICD-10-CM

## 2023-02-21 DIAGNOSIS — F41.9 ANXIETY AND DEPRESSION: ICD-10-CM

## 2023-02-21 DIAGNOSIS — M25.561 ACUTE PAIN OF RIGHT KNEE: ICD-10-CM

## 2023-02-21 DIAGNOSIS — F32.A ANXIETY AND DEPRESSION: ICD-10-CM

## 2023-02-21 DIAGNOSIS — M25.561 ACUTE PAIN OF RIGHT KNEE: Primary | ICD-10-CM

## 2023-02-21 PROCEDURE — 73564 X-RAY EXAM KNEE 4 OR MORE: CPT | Performed by: PHYSICAL MEDICINE & REHABILITATION

## 2023-02-21 PROCEDURE — 3008F BODY MASS INDEX DOCD: CPT | Performed by: PHYSICAL MEDICINE & REHABILITATION

## 2023-02-21 PROCEDURE — 99244 OFF/OP CNSLTJ NEW/EST MOD 40: CPT | Performed by: PHYSICAL MEDICINE & REHABILITATION

## 2023-02-21 NOTE — PATIENT INSTRUCTIONS
-Start physical therapy and home exercises  -Advil/Aleve as needed  -Ice/Heat as tolerated  -Xray on the way out today  -If no better will consider MRI for further evaluation  -Follow up in 4 weeks

## 2023-03-02 ENCOUNTER — OFFICE VISIT (OUTPATIENT)
Dept: SURGERY | Facility: CLINIC | Age: 48
End: 2023-03-02

## 2023-03-02 DIAGNOSIS — M65.331 TRIGGER MIDDLE FINGER OF RIGHT HAND: Primary | ICD-10-CM

## 2023-03-02 DIAGNOSIS — M65.30 TRIGGER FINGER OF LEFT HAND, UNSPECIFIED FINGER: Primary | ICD-10-CM

## 2023-03-02 PROCEDURE — 29130 APPL FINGER SPLINT STATIC: CPT | Performed by: OCCUPATIONAL THERAPIST

## 2023-03-02 PROCEDURE — 99204 OFFICE O/P NEW MOD 45 MIN: CPT | Performed by: PLASTIC SURGERY

## 2023-03-06 ENCOUNTER — MED REC SCAN ONLY (OUTPATIENT)
Dept: PHYSICAL MEDICINE AND REHAB | Facility: CLINIC | Age: 48
End: 2023-03-06

## 2023-03-06 NOTE — PROGRESS NOTES
Subjective: My RMF triggers at nite. Objective:     Current level of performance:  ADL: Independent  Work: No limtations  Leisure: Not addressed    Measurements/Tests:  ROM:      N/A            Treatment Provided this day: Fabricated a RMF extension splint for nite wear. Treatment Time: 20 minutes      Summary/Analysis of Treatment session: Tolerated the fabrication of a RMF extension splint well. Plan: Discontinue OT      Follow up in: To call with questions and or concerns.           Merritt Chinchilla  OTR/L

## 2023-03-21 ENCOUNTER — OFFICE VISIT (OUTPATIENT)
Dept: PHYSICAL MEDICINE AND REHAB | Facility: CLINIC | Age: 48
End: 2023-03-21
Payer: COMMERCIAL

## 2023-03-21 VITALS
DIASTOLIC BLOOD PRESSURE: 86 MMHG | SYSTOLIC BLOOD PRESSURE: 120 MMHG | HEART RATE: 78 BPM | WEIGHT: 198 LBS | OXYGEN SATURATION: 96 % | HEIGHT: 69 IN | BODY MASS INDEX: 29.33 KG/M2

## 2023-03-21 DIAGNOSIS — G89.29 CHRONIC PAIN OF RIGHT KNEE: Primary | ICD-10-CM

## 2023-03-21 DIAGNOSIS — M25.561 CHRONIC PAIN OF RIGHT KNEE: Primary | ICD-10-CM

## 2023-03-21 PROCEDURE — 3074F SYST BP LT 130 MM HG: CPT | Performed by: PHYSICAL MEDICINE & REHABILITATION

## 2023-03-21 PROCEDURE — 3079F DIAST BP 80-89 MM HG: CPT | Performed by: PHYSICAL MEDICINE & REHABILITATION

## 2023-03-21 PROCEDURE — 99214 OFFICE O/P EST MOD 30 MIN: CPT | Performed by: PHYSICAL MEDICINE & REHABILITATION

## 2023-03-21 PROCEDURE — 3008F BODY MASS INDEX DOCD: CPT | Performed by: PHYSICAL MEDICINE & REHABILITATION

## 2023-03-21 NOTE — PATIENT INSTRUCTIONS
-MRI of the knee and follow up after  -Continue home exercises  -Ice and topical treatment as needed

## 2023-03-23 ENCOUNTER — TELEPHONE (OUTPATIENT)
Dept: PHYSICAL MEDICINE AND REHAB | Facility: CLINIC | Age: 48
End: 2023-03-23

## 2023-03-23 NOTE — TELEPHONE ENCOUNTER
Spoke to patient he stated he was busy at the moment and he said he would schedule his appt via [address] [place] [address] f/u

## 2023-04-03 ENCOUNTER — HOSPITAL ENCOUNTER (OUTPATIENT)
Dept: MRI IMAGING | Facility: HOSPITAL | Age: 48
Discharge: HOME | End: 2023-04-03
Attending: PHYSICAL MEDICINE & REHABILITATION
Payer: COMMERCIAL

## 2023-04-03 DIAGNOSIS — M25.561 CHRONIC PAIN OF RIGHT KNEE: ICD-10-CM

## 2023-04-03 DIAGNOSIS — G89.29 CHRONIC PAIN OF RIGHT KNEE: ICD-10-CM

## 2023-04-03 PROCEDURE — 73721 MRI JNT OF LWR EXTRE W/O DYE: CPT | Performed by: PHYSICAL MEDICINE & REHABILITATION

## 2023-04-24 ENCOUNTER — OFFICE VISIT (OUTPATIENT)
Dept: PHYSICAL MEDICINE AND REHAB | Facility: CLINIC | Age: 48
End: 2023-04-24
Payer: COMMERCIAL

## 2023-04-24 VITALS
OXYGEN SATURATION: 98 % | SYSTOLIC BLOOD PRESSURE: 116 MMHG | BODY MASS INDEX: 30 KG/M2 | HEART RATE: 63 BPM | DIASTOLIC BLOOD PRESSURE: 70 MMHG | WEIGHT: 200 LBS

## 2023-04-24 DIAGNOSIS — E78.00 PURE HYPERCHOLESTEROLEMIA: ICD-10-CM

## 2023-04-24 DIAGNOSIS — M25.561 CHRONIC PAIN OF RIGHT KNEE: Primary | ICD-10-CM

## 2023-04-24 DIAGNOSIS — G89.29 CHRONIC PAIN OF RIGHT KNEE: Primary | ICD-10-CM

## 2023-04-24 DIAGNOSIS — F32.A ANXIETY AND DEPRESSION: ICD-10-CM

## 2023-04-24 DIAGNOSIS — F41.9 ANXIETY AND DEPRESSION: ICD-10-CM

## 2023-04-24 PROCEDURE — 99214 OFFICE O/P EST MOD 30 MIN: CPT | Performed by: PHYSICAL MEDICINE & REHABILITATION

## 2023-04-24 PROCEDURE — 3078F DIAST BP <80 MM HG: CPT | Performed by: PHYSICAL MEDICINE & REHABILITATION

## 2023-04-24 PROCEDURE — 3074F SYST BP LT 130 MM HG: CPT | Performed by: PHYSICAL MEDICINE & REHABILITATION

## 2023-04-24 NOTE — PATIENT INSTRUCTIONS
-Follow up as needed  -Finish PT and continue home exercises  -Avoid direct impact exercises on the knee such as kicking the inside of the knee  -Ice akbar needed and rest as needed

## 2023-07-11 ENCOUNTER — MED REC SCAN ONLY (OUTPATIENT)
Dept: PHYSICAL MEDICINE AND REHAB | Facility: CLINIC | Age: 48
End: 2023-07-11

## 2024-04-16 ENCOUNTER — NURSE TRIAGE (OUTPATIENT)
Dept: INTERNAL MEDICINE CLINIC | Facility: CLINIC | Age: 49
End: 2024-04-16

## 2024-04-16 ENCOUNTER — LAB ENCOUNTER (OUTPATIENT)
Dept: LAB | Facility: HOSPITAL | Age: 49
End: 2024-04-16
Attending: NURSE PRACTITIONER
Payer: COMMERCIAL

## 2024-04-16 DIAGNOSIS — F32.A ANXIETY AND DEPRESSION: ICD-10-CM

## 2024-04-16 DIAGNOSIS — F41.9 ANXIETY AND DEPRESSION: ICD-10-CM

## 2024-04-16 DIAGNOSIS — E78.00 PURE HYPERCHOLESTEROLEMIA: ICD-10-CM

## 2024-04-16 LAB
ALBUMIN SERPL-MCNC: 4.5 G/DL (ref 3.2–4.8)
ALBUMIN/GLOB SERPL: 1.6 {RATIO} (ref 1–2)
ALP LIVER SERPL-CCNC: 82 U/L
ALT SERPL-CCNC: 32 U/L
ANION GAP SERPL CALC-SCNC: 8 MMOL/L (ref 0–18)
AST SERPL-CCNC: 26 U/L (ref ?–34)
BASOPHILS # BLD AUTO: 0.08 X10(3) UL (ref 0–0.2)
BASOPHILS NFR BLD AUTO: 0.7 %
BILIRUB SERPL-MCNC: 0.7 MG/DL (ref 0.3–1.2)
BUN BLD-MCNC: 12 MG/DL (ref 9–23)
BUN/CREAT SERPL: 11.5 (ref 10–20)
CALCIUM BLD-MCNC: 9.6 MG/DL (ref 8.7–10.4)
CHLORIDE SERPL-SCNC: 107 MMOL/L (ref 98–112)
CHOLEST SERPL-MCNC: 215 MG/DL (ref ?–200)
CO2 SERPL-SCNC: 27 MMOL/L (ref 21–32)
CREAT BLD-MCNC: 1.04 MG/DL
DEPRECATED RDW RBC AUTO: 35.5 FL (ref 35.1–46.3)
EGFRCR SERPLBLD CKD-EPI 2021: 89 ML/MIN/1.73M2 (ref 60–?)
EOSINOPHIL # BLD AUTO: 0.18 X10(3) UL (ref 0–0.7)
EOSINOPHIL NFR BLD AUTO: 1.7 %
ERYTHROCYTE [DISTWIDTH] IN BLOOD BY AUTOMATED COUNT: 11.7 % (ref 11–15)
FASTING PATIENT LIPID ANSWER: NO
FASTING STATUS PATIENT QL REPORTED: NO
GLOBULIN PLAS-MCNC: 2.8 G/DL (ref 2.8–4.4)
GLUCOSE BLD-MCNC: 85 MG/DL (ref 70–99)
HCT VFR BLD AUTO: 44.4 %
HDLC SERPL-MCNC: 41 MG/DL (ref 40–59)
HGB BLD-MCNC: 15.6 G/DL
IMM GRANULOCYTES # BLD AUTO: 0.04 X10(3) UL (ref 0–1)
IMM GRANULOCYTES NFR BLD: 0.4 %
LDLC SERPL CALC-MCNC: 137 MG/DL (ref ?–100)
LYMPHOCYTES # BLD AUTO: 3.23 X10(3) UL (ref 1–4)
LYMPHOCYTES NFR BLD AUTO: 30.1 %
MCH RBC QN AUTO: 29.9 PG (ref 26–34)
MCHC RBC AUTO-ENTMCNC: 35.1 G/DL (ref 31–37)
MCV RBC AUTO: 85.1 FL
MONOCYTES # BLD AUTO: 0.65 X10(3) UL (ref 0.1–1)
MONOCYTES NFR BLD AUTO: 6.1 %
NEUTROPHILS # BLD AUTO: 6.56 X10 (3) UL (ref 1.5–7.7)
NEUTROPHILS # BLD AUTO: 6.56 X10(3) UL (ref 1.5–7.7)
NEUTROPHILS NFR BLD AUTO: 61 %
NONHDLC SERPL-MCNC: 174 MG/DL (ref ?–130)
OSMOLALITY SERPL CALC.SUM OF ELEC: 293 MOSM/KG (ref 275–295)
PLATELET # BLD AUTO: 274 10(3)UL (ref 150–450)
POTASSIUM SERPL-SCNC: 4.3 MMOL/L (ref 3.5–5.1)
PROT SERPL-MCNC: 7.3 G/DL (ref 5.7–8.2)
RBC # BLD AUTO: 5.22 X10(6)UL
SODIUM SERPL-SCNC: 142 MMOL/L (ref 136–145)
TRIGL SERPL-MCNC: 205 MG/DL (ref 30–149)
TSI SER-ACNC: 2.86 MIU/ML (ref 0.55–4.78)
VIT D+METAB SERPL-MCNC: 25.3 NG/ML (ref 30–100)
VLDLC SERPL CALC-MCNC: 38 MG/DL (ref 0–30)
WBC # BLD AUTO: 10.7 X10(3) UL (ref 4–11)

## 2024-04-16 PROCEDURE — 80053 COMPREHEN METABOLIC PANEL: CPT

## 2024-04-16 PROCEDURE — 82306 VITAMIN D 25 HYDROXY: CPT

## 2024-04-16 PROCEDURE — 85025 COMPLETE CBC W/AUTO DIFF WBC: CPT

## 2024-04-16 PROCEDURE — 80061 LIPID PANEL: CPT

## 2024-04-16 PROCEDURE — 84443 ASSAY THYROID STIM HORMONE: CPT

## 2024-04-16 PROCEDURE — 36415 COLL VENOUS BLD VENIPUNCTURE: CPT

## 2024-04-16 NOTE — TELEPHONE ENCOUNTER
Please reply to pool: EM RN TRIAGE  Action Requested: Summary for Provider     []  Critical Lab, Recommendations Needed  [] Need Additional Advice  [x]   FYI    []   Need Orders  [] Need Medications Sent to Pharmacy  []  Other     SUMMARY: Patient contacts clinic reporting depression and stress.  He reports generalized fatigue and feeling unmotivated. Can feel his heart pounding in his chest at times.  Denies chest pain or shortness of breath, dizziness or lightheadedness.  Denies thoughts of suicide or self harm.  Becoming increasingly agitated at little things.  Acute visit booked today.     Reason for call: Depression  Onset: Data Unavailable                       Reason for Disposition   Symptoms interfere with work or school   Depression is worsening (e.g.,sleeping poorly, less able to do activities of daily living)    Protocols used: Depression-A-[place]

## 2024-04-17 RX ORDER — SERTRALINE HYDROCHLORIDE 25 MG/1
25 TABLET, FILM COATED ORAL DAILY
Qty: 30 TABLET | Refills: 0 | OUTPATIENT
Start: 2024-04-17

## 2024-04-18 ENCOUNTER — MED REC SCAN ONLY (OUTPATIENT)
Dept: INTERNAL MEDICINE CLINIC | Facility: CLINIC | Age: 49
End: 2024-04-18

## 2024-07-16 RX ORDER — SERTRALINE HYDROCHLORIDE 25 MG/1
25 TABLET, FILM COATED ORAL DAILY
Qty: 30 TABLET | Refills: 0 | OUTPATIENT
Start: 2024-07-16

## 2024-07-16 RX ORDER — SERTRALINE HYDROCHLORIDE 25 MG/1
25 TABLET, FILM COATED ORAL DAILY
Qty: 90 TABLET | Refills: 0 | OUTPATIENT
Start: 2024-07-16

## 2024-07-16 NOTE — TELEPHONE ENCOUNTER
Refill passed per [facility] protocol.    Last Office Visit: 04/16/2024  Sent [facility] message to schedule follow up appointment- was to follow up 2 weeks later around 04/30/2024.    Please advise if refill is appropriate.     Requested Prescriptions   Pending Prescriptions Disp Refills    SERTRALINE 25 MG Oral Tab [Pharmacy Med Name: Sertraline Hydrochloride 25 Mg Tab Nort] 90 tablet 0     Sig: Take 1 tablet (25 mg total) by mouth daily.       Psychiatric Non-Scheduled (Anti-Anxiety) Passed - 7/12/2024  9:25 AM        Passed - In person appointment or virtual visit in the past 6 mos or appointment in next 3 mos     Recent Outpatient Visits              3 months ago Anxiety and depression    [facility] [address], [place] Bibiana Joy APRN    Office Visit    1 year ago [facility]    [facility]    1 year ago [facility]    Office Visit    1 year ago Trigger finger of left hand, unspecified finger    [facility] Lawrence Peralta, OTRL    Office Visit    1 year ago Trigger middle finger of right hand    [facility] Kt Zimmer MD    Office Visit                      Passed - Depression Screening completed within the past 12 months             Recent Outpatient Visits              3 months ago Anxiety and depression    [facility], [address], [place] Bibiana Joy APRN    Office Visit    1 year ago Chronic pain of right knee    [facility] [place] Yogalicia Y, DO    Office Visit    1 year ago Chronic pain of right knee    [facility] [place] Yogen Y, DO    Office Visit    1 year ago Trigger finger of left hand, unspecified  finger    [facility], [facility], [place] Benitez, YOON    Office Visit    1 year ago Trigger middle finger of right hand    [facility], Kt Rivero MD    Office Visit

## 2024-07-17 RX ORDER — SERTRALINE HYDROCHLORIDE 25 MG/1
25 TABLET, FILM COATED ORAL DAILY
Qty: 90 TABLET | Refills: 0 | Status: SHIPPED | OUTPATIENT
Start: 2024-07-17

## 2024-07-17 NOTE — TELEPHONE ENCOUNTER
Dr Bear patient is asking if you can refill his sertraline 25mg.   Patient out of medication.   He states it has been helping him.     Last office visit 4/16/24 with PRASANTH CABAN;  Rx was denied due to needing follow up visit.   (He is not taking busprione)    Patient states he has been on this medications before in the past when you were prescribing.   He preferred to see you if he needs appointment. He can come in anytime after 12pm.     Ok to use Res24 if appointment is needed?

## 2024-07-17 NOTE — TELEPHONE ENCOUNTER
Spoke with the patient,verified full name and     Informed him of message and assisted with appointment.    Future Appointments   Date Time Provider Department Center   2024  3:00 PM Maria G Bear MD [facility]

## 2024-07-17 NOTE — TELEPHONE ENCOUNTER
Patient called to follow-up on this refill request. Per patient he is completely out of his medication.

## 2024-07-17 NOTE — TELEPHONE ENCOUNTER
ZOYA: Talked to patient, offered him an appointment but he says that either he will call back or he will make the appointment thru his MyChart. But asking about his refill when he's going to receive it.

## 2024-07-22 ENCOUNTER — OFFICE VISIT (OUTPATIENT)
Dept: INTERNAL MEDICINE CLINIC | Facility: CLINIC | Age: 49
End: 2024-07-22

## 2024-07-22 VITALS
HEIGHT: 69 IN | BODY MASS INDEX: 28.29 KG/M2 | WEIGHT: 191 LBS | DIASTOLIC BLOOD PRESSURE: 77 MMHG | HEART RATE: 72 BPM | SYSTOLIC BLOOD PRESSURE: 117 MMHG

## 2024-07-22 DIAGNOSIS — Z12.11 SCREEN FOR COLON CANCER: ICD-10-CM

## 2024-07-22 DIAGNOSIS — F32.A ANXIETY AND DEPRESSION: ICD-10-CM

## 2024-07-22 DIAGNOSIS — R53.83 OTHER FATIGUE: ICD-10-CM

## 2024-07-22 DIAGNOSIS — R59.0 LYMPHADENOPATHY, INGUINAL: ICD-10-CM

## 2024-07-22 DIAGNOSIS — R06.83 SNORING: Primary | ICD-10-CM

## 2024-07-22 DIAGNOSIS — G44.89 OTHER HEADACHE SYNDROME: ICD-10-CM

## 2024-07-22 DIAGNOSIS — F41.9 ANXIETY AND DEPRESSION: ICD-10-CM

## 2024-07-22 DIAGNOSIS — E78.00 PURE HYPERCHOLESTEROLEMIA: ICD-10-CM

## 2024-07-22 PROCEDURE — 3078F DIAST BP <80 MM HG: CPT | Performed by: INTERNAL MEDICINE

## 2024-07-22 PROCEDURE — 99215 OFFICE O/P EST HI 40 MIN: CPT | Performed by: INTERNAL MEDICINE

## 2024-07-22 PROCEDURE — 3008F BODY MASS INDEX DOCD: CPT | Performed by: INTERNAL MEDICINE

## 2024-07-22 PROCEDURE — 3074F SYST BP LT 130 MM HG: CPT | Performed by: INTERNAL MEDICINE

## 2024-07-22 NOTE — PROGRESS NOTES
Subjective:   Patient ID: Nicolás Stallworth is a 48 year old male.  Chief Complaint   Patient presents with    Medication Follow-Up     Follow up on taking sertraline    Headache     C/o on/off headaches that often happens in the morning.  Last time his headaches occurred on Friday or Saturday.       Patient in office today for f/u   for headache and f/u on medication sertraline state he   feels good on medication  .  Patient  state he only  feels  tired and   Also having  headache chronic in AM mostly feels tired  and no energy   Was told he snores   Denies chest pain, shortnesss of breath, palpitations, or abdominal pain, denies UTI symptoms fever or chills.   Medication Follow-Up  Associated symptoms include fatigue and headaches (in AM   goes away  with Ibuprofenv). Pertinent negatives include no abdominal pain, chest pain, chills, coughing, fever, nausea, numbness, sore throat or vomiting.   Headache   This is a new problem. Episode onset: years. The problem has been unchanged. The pain is located in the Occipital region. The pain does not radiate. The pain quality is similar to prior headaches. The quality of the pain is described as aching (neck  pain - with   boxing    4 x week -- pt  state  2  years). The pain is mild. Pertinent negatives include no abdominal pain, blurred vision, coughing, dizziness, drainage, ear pain, eye pain, eye redness, fever, loss of balance, nausea, numbness, phonophobia, photophobia, sore throat, tingling, tinnitus or vomiting. Associated symptoms comments: Fatigue in AM especially . The symptoms are aggravated by fatigue. He has tried NSAIDs for the symptoms. The treatment provided significant relief.       History/Other:   Review of Systems   Constitutional:  Positive for fatigue. Negative for chills and fever.   HENT:  Negative for ear pain, sore throat and tinnitus.    Eyes:  Negative for blurred vision, photophobia, pain and redness.   Respiratory:  Negative for cough,  shortness of breath and wheezing.         Snoring    Cardiovascular:  Negative for chest pain, palpitations and leg swelling.   Gastrointestinal:  Negative for abdominal pain, constipation, diarrhea, nausea and vomiting.   Genitourinary:  Negative for decreased urine volume, dysuria, frequency, genital sores, penile discharge, penile pain, penile swelling, testicular pain and urgency.        Small lymph enlarged - in  right  groin  area  recently noticed   after  shaving    Skin:  Negative for pallor.   Neurological:  Positive for headaches (in AM   goes away  with Ibuprofenv). Negative for dizziness, tingling, numbness and loss of balance.   Psychiatric/Behavioral:  Negative for confusion and sleep disturbance. The patient is not nervous/anxious.         [place] of  motivation  in AM sometimes .     Current Outpatient Medications   Medication Sig Dispense Refill    sertraline 25 MG Oral Tab Take 1 tablet (25 mg total) by mouth daily. 90 tablet 0    busPIRone 10 MG Oral Tab Take 1 tablet (10 mg total) by mouth 3 (three) times daily as needed. (Patient not taking: Reported on 7/22/2024) 90 tablet 0     Allergies:No Known Allergies    Objective:   Physical Exam  Vitals and nursing note reviewed.   Constitutional:       General: He is not in acute distress.     Appearance: Normal appearance.   HENT:      Head: Normocephalic and atraumatic.   Cardiovascular:      Rate and Rhythm: Normal rate and regular rhythm.      Heart sounds: Normal heart sounds. No murmur heard.  Pulmonary:      Effort: Pulmonary effort is normal.      Breath sounds: Normal breath sounds. No wheezing or rales.   Abdominal:      Palpations: Abdomen is soft. There is no mass.      Tenderness: There is no abdominal tenderness.   Genitourinary:         Comments: Non tender  1-2   cm  ly  node  noticed  after  shawing   recently   Musculoskeletal:         General: Normal range of motion.      Cervical back: Normal range of motion.      Right lower leg: No  edema.      Left lower leg: No edema.   Skin:     General: Skin is warm and dry.      Findings: No erythema.   Neurological:      Mental Status: He is alert and oriented to person, place, and time.     Blood pressure 117/77, pulse 72, height 5' 9\" (1.753 m), weight 191 lb (86.6 kg).      Assessment & Plan:   1. Anxiety and depression  Stable  cpm   Sertraline 25 mg every day   Pt tolerates medication well  Side effects and directions of medication discussed with patient. Patient verbalized understanding and compliance.      2. Pure hypercholesterolemia  Keep low saturated fat  diet   Avoid red meat and fast, fried food  Take fruits and vegetables   Keep active /walking ,exercise as  tolerated  Reach healthy weight   Continue present management    Labs  monitor     3. Other fatigue   4. Snoring  5. Other headache syndrome in am   Possible  sleep apnea   Will  check sleep  test   Pt  education   Sleep 8 hr at night   Avoid driving if any sleepy   - DIAGOSTIC SLEEP STUDY  - General sleep study; Future    Screen for colon cancer  - Gastro GI Telephone Colon Screen; Future     Referred         Enlarged  inguinal node   R   inguinal area   Reactive likely    From  shawing likely   Recheck in  4  weeks   If  persists or worsening - check US    inguinal area  No orders of the defined types were placed in this encounter.      Meds This Visit:  Requested Prescriptions      No prescriptions requested or ordered in this encounter       Imaging & Referrals:  [facility] REFERRAL DIAGOSTIC SLEEP STUDY ADULT  OP REFERRAL TO [facility] GI TELEPHONE COLON SCREEN    Total time spent caring for the patient on the day of the encounter:  40 min  This includes pre-charting, reviewing and obtaining results, exam, plan, notes, and counseling.

## 2024-10-10 RX ORDER — SERTRALINE HYDROCHLORIDE 25 MG/1
25 TABLET, FILM COATED ORAL DAILY
Qty: 90 TABLET | Refills: 3 | Status: SHIPPED | OUTPATIENT
Start: 2024-10-10

## 2024-10-10 NOTE — TELEPHONE ENCOUNTER
REFILL PASSED PER [facility] PROTOCOLS    Requested Prescriptions   Pending Prescriptions Disp Refills    sertraline 25 MG Oral Tab 90 tablet 0     Sig: Take 1 tablet (25 mg total) by mouth daily.       Psychiatric Non-Scheduled (Anti-Anxiety) Passed - 10/10/2024  8:51 AM        Passed - In person appointment or virtual visit in the past 6 mos or appointment in next 3 mos     Recent Outpatient Visits              1 month ago Anxiety and depression    [facility]    Office Visit    2 months ago oring    [facility] Maria G Aquino MD    Office Visit    5 months ago Anxiety and depression    [facility] [address], [place] Bibiana Joy APRN    Office Visit    1 year ago Chronic pain of right knee    [facility], Lucrecia Williamson, DO    Office Visit    1 year ago Chronic pain of right knee    [facility], Lucrecia Williamson, DO    Office Visit                      Passed - Depression Screening completed within the past 12 months               Recent Outpatient Visits              1 month ago Anxiety and depression    [facility]    Office Visit    2 months ago Snoring    [facility], Maria G Aquino MD    Office Visit    5 months ago Anxiety and depression    [facility], [address], [place] Bibiana Joy APRN    Office Visit    1 year ago Chronic pain of right knee    [facility], Lucrecia Williamson, DO    Office Visit    1 year ago Chronic pain of right knee    [facility], Lucrecia Williamson, DO    Office Visit

## 2024-12-04 ENCOUNTER — TELEPHONE (OUTPATIENT)
Dept: INTERNAL MEDICINE CLINIC | Facility: CLINIC | Age: 49
End: 2024-12-04

## 2024-12-04 NOTE — TELEPHONE ENCOUNTER
Patient calling ( name and date of birth of patient verified ) states he spoke with his insurance in  regards to his sleep study     Insurance states  \" test was not  medically necessary and do not want to pay for it \"    Patient is upset about this and would like to be notified of resolution     Please advise and thank you.        Best call back number: Mark  at [phone]          SLEEP STUDY SCAN: Patient Communication     Append Comments   Seen    Dear Nicolás,     Sleep test  shows NO evidence of sleep apnea.  Still recommend you to avoid alcohol and sedative medications, as these may worsen severity of your symptoms, avoid drowsy driving.           I recommend you to continue present management and have a follow up visit within 1 month or sooner if there are any symptoms or concerns.     Best Regards,  KIRILL Bear M.D.   Written by Maria G Bear MD on 9/12/2024  8:31 AM CDT  Seen by patient Nicolás Stallworth on 9/12/2024  9:17 AM

## 2024-12-04 NOTE — TELEPHONE ENCOUNTER
Patient had a sleep study in September 2024   that did not show any sleep apnea     Recommend patient to follow-up visit with me , we can discuss test results and patient's symptoms especially if still   persistent symptoms

## 2024-12-05 RX ORDER — SERTRALINE HYDROCHLORIDE 25 MG/1
25 TABLET, FILM COATED ORAL DAILY
Qty: 90 TABLET | Refills: 3 | OUTPATIENT
Start: 2024-12-05

## 2024-12-05 NOTE — TELEPHONE ENCOUNTER
sertraline 25 MG Oral Tab 90 tablet 3 10/10/2024 --    Sig - Route: Take 1 tablet (25 mg total) by mouth daily. - Oral    Sent to pharmacy as: Sertraline HCl 25 MG Oral Tablet (Zoloft)    E-Prescribing Status: Receipt confirmed by pharmacy (10/10/2024  8:52 AM CDT)      Pharmacy    [id number] - [place], [place] - [address] [phone], [phone]

## 2024-12-05 NOTE — TELEPHONE ENCOUNTER
Advised patient of Dr Bear's note. Patient verbalized understanding.     Future Appointments   Date Time Provider Department Center   12/12/2024  3:00 PM Maria G Bear MD [facility]

## 2024-12-16 ENCOUNTER — TELEPHONE (OUTPATIENT)
Dept: INTERNAL MEDICINE CLINIC | Facility: CLINIC | Age: 49
End: 2024-12-16

## 2024-12-16 NOTE — TELEPHONE ENCOUNTER
Second appointment for patient had to be cancelled due to providers availability. Pt has two concerns:   1. He would like the detailed results of the Sleep Study explained to him. - Triage.    2. Patient says his insurance has refused to pay for the study.   told him it would not be a problem and Alexei confirmed the Prior Authorization #[id number] (9/1/24 - 11/1/24 timeframe - test done on 9/7/24.  Sending this information to the patient.    Pt needs to be called as he has been waiting since 12/4/24 of study findings.    Thank you so much for your efforts!

## 2024-12-16 NOTE — TELEPHONE ENCOUNTER
Reviewed results as per below - no further questions or concerns at this time, he will schedule in the future if he has questions or something comes up.       Dear Nicolás,     Sleep test  shows NO evidence of sleep apnea.  Still recommend you to avoid alcohol and sedative medications, as these may worsen severity of your symptoms, avoid drowsy driving.           I recommend you to continue present management and have a follow up visit within 1 month or sooner if there are any symptoms or concerns.     Best Regards,  KIRILL Bear M.D.   Written by Maria G Bear MD on 9/12/2024  8:31 AM CDT  Seen by patient Nicolás Stallworth on 9/12/2024  9:17 AM

## 2025-08-19 ENCOUNTER — TELEPHONE (OUTPATIENT)
Dept: INTERNAL MEDICINE CLINIC | Facility: CLINIC | Age: 50
End: 2025-08-19

## 2025-08-27 ENCOUNTER — PATIENT OUTREACH (OUTPATIENT)
Dept: CASE MANAGEMENT | Age: 50
End: 2025-08-27

## (undated) DEVICE — SUTURE PLAIN GUT 5-0 PC-1

## (undated) DEVICE — APPLICATOR COTTON TIP 3 10/PK

## (undated) DEVICE — SUCTION CANISTER, 3000CC,SAFELINER: Brand: DEROYAL

## (undated) DEVICE — ENCORE® LATEX ACCLAIM SIZE 8, STERILE LATEX POWDER-FREE SURGICAL GLOVE: Brand: ENCORE

## (undated) DEVICE — PACKING 8CM LNG SPT NSL STNG

## (undated) DEVICE — HEAD & NECK: Brand: MEDLINE INDUSTRIES, INC.

## (undated) DEVICE — REFLEX ULTRA 45 WITH INTEGRATED CABLE: Brand: COBLATION

## (undated) DEVICE — SUTURE PLAIN GUT 4-0 SC-1

## (undated) DEVICE — NASAL ACCESSORY: Brand: MEDLINE INDUSTRIES, INC.

## (undated) DEVICE — SOL  .9 1000ML BTL

## (undated) NOTE — Clinical Note
Dear Dr. Keith Epley,    Thank you for sending Emy  to see me for physiatry consultation. I appreciate your confidence in me to care for your patients. Please feel free call me with any questions at [phone] or contact me through [facility]     Sincere

## (undated) NOTE — LETTER
10/20/21        [facility]  [address], [facility]  [address]      Dear Albania Lyon,    Our records indicate that you have outstanding lab work and or testing that was ordered for you and has not yet been completed:  Orders Placed This Encounter

## (undated) NOTE — LETTER
23      Patient: Yue Coma  : 1975 Visit date: 3/2/2023    Dear  Osito Baires,      I examined your patient in consultation today. He has right middle finger triggering. He would like to try splinting and anti-inflammatory prior to considering an injection. We have fashioned a night splint and have placed him on a short course of ibuprofen. Thank you for your kind referral. If I may answer any questions, please feel free to contact me.      Sincerely,   Remi Cohen MD     CC:   No Recipients

## (undated) NOTE — LETTER
[facility]  [address] [place] [address],  [address]       02/11/21        Patient: Melia Frias   YOB: 1975   Date of Visit: 2/11/2021       Dear  Dr. Jovi Mcduffie MD,      Thank you for referring Melia Frias to my practice

## (undated) NOTE — LETTER
01/20/22        [facility]  [address], [facility]  [address]      Dear Talib Joya,    Our records indicate that you have outstanding lab work and or testing that was ordered for you and has not yet been completed:  Orders Placed This Encounter

## (undated) NOTE — LETTER
12/27/21        [facility]  [address], [facility]  [address]      Dear Sena Jenkins,    Our records indicate that you have outstanding lab work and or testing that was ordered for you and has not yet been completed:  Orders Placed This Encounter